# Patient Record
Sex: FEMALE | Race: WHITE | NOT HISPANIC OR LATINO | URBAN - METROPOLITAN AREA
[De-identification: names, ages, dates, MRNs, and addresses within clinical notes are randomized per-mention and may not be internally consistent; named-entity substitution may affect disease eponyms.]

---

## 2018-02-06 ENCOUNTER — OFFICE (OUTPATIENT)
Dept: URBAN - METROPOLITAN AREA CLINIC 33 | Facility: CLINIC | Age: 33
End: 2018-02-06

## 2018-02-06 VITALS
WEIGHT: 148 LBS | DIASTOLIC BLOOD PRESSURE: 81 MMHG | TEMPERATURE: 97.7 F | HEIGHT: 64 IN | HEART RATE: 79 BPM | SYSTOLIC BLOOD PRESSURE: 114 MMHG

## 2018-02-06 DIAGNOSIS — R10.13 EPIGASTRIC PAIN: ICD-10-CM

## 2018-02-06 DIAGNOSIS — K21.0 GASTRO-ESOPHAGEAL REFLUX DISEASE WITH ESOPHAGITIS: ICD-10-CM

## 2018-02-06 PROCEDURE — 99244 OFF/OP CNSLTJ NEW/EST MOD 40: CPT

## 2018-02-06 RX ORDER — SUCRALFATE 1 G/1
3 TABLET ORAL
Qty: 90 | Refills: 3 | Status: ACTIVE
Start: 2018-02-06

## 2018-02-06 RX ORDER — SUCRALFATE 1 G/1
TABLET ORAL
Qty: 90 | Refills: 4 | Status: COMPLETED
End: 2018-08-16

## 2018-08-16 PROBLEM — K21.0 GASTRO-ESOPHAGEAL REFLUX DISEASE WITH ESOPHAGITIS: Status: ACTIVE | Noted: 2018-08-16

## 2018-08-16 PROBLEM — K31.89 OTHER DISEASES OF STOMACH AND DUODENUM: Status: ACTIVE | Noted: 2018-08-16

## 2018-08-16 PROBLEM — K44.9 DIAPHRAGMATIC HERNIA WITHOUT OBSTRUCTION OR GANGRENE: Status: ACTIVE | Noted: 2018-08-16

## 2019-08-08 ENCOUNTER — OFFICE (OUTPATIENT)
Dept: URBAN - METROPOLITAN AREA CLINIC 33 | Facility: CLINIC | Age: 34
End: 2019-08-08

## 2019-08-08 VITALS
SYSTOLIC BLOOD PRESSURE: 135 MMHG | HEIGHT: 64 IN | DIASTOLIC BLOOD PRESSURE: 87 MMHG | HEART RATE: 88 BPM | TEMPERATURE: 97.7 F | WEIGHT: 148 LBS

## 2019-08-08 DIAGNOSIS — R14.0 ABDOMINAL DISTENSION (GASEOUS): ICD-10-CM

## 2019-08-08 DIAGNOSIS — R12 HEARTBURN: ICD-10-CM

## 2019-08-08 DIAGNOSIS — K21.0 GASTRO-ESOPHAGEAL REFLUX DISEASE WITH ESOPHAGITIS: ICD-10-CM

## 2019-08-08 DIAGNOSIS — R10.11 RIGHT UPPER QUADRANT PAIN: ICD-10-CM

## 2019-08-08 PROCEDURE — 99214 OFFICE O/P EST MOD 30 MIN: CPT

## 2019-08-08 NOTE — SERVICEHPINOTES
BRETT AWAN   is a   34  female who complains of ongoing stomach pains. She reports "gnawing" discomfort over epigastric region that can radiate to RUQ area, occurs several times per week, lasting several minutes, no worse with po intake, and associated sx include: heartburn, bloating, gas. She was last seen by Dr. Fritz in 02/2018 to evaluate this same concern with advise to try Carafate that she used 1 pill BID x 1 year with some improvement. Her most recent 08/2018 EGD found evidence of mild gastritis bx neg h pylori and neg IM. Prior trial of Prilosec 20 mg qd x 8 weeks that did help but stopped due to being unsure if it could be continued for long term use. She is also concerned about possible "GB stones": No known h/o GB disease (No prior RUQ u/s to evaluate for this concern). Rare NSAID use. Denies n/v, cough, change in voice, dysphagia, regurgitation, early satiety, lower abdominal pain, melena, rectal bleeding, blood in stool, weight loss. BR

## 2022-12-09 ENCOUNTER — NURSE TRIAGE (OUTPATIENT)
Dept: OTHER | Facility: CLINIC | Age: 37
End: 2022-12-09
